# Patient Record
Sex: FEMALE | Race: WHITE | NOT HISPANIC OR LATINO | URBAN - METROPOLITAN AREA
[De-identification: names, ages, dates, MRNs, and addresses within clinical notes are randomized per-mention and may not be internally consistent; named-entity substitution may affect disease eponyms.]

---

## 2017-02-06 ENCOUNTER — EMERGENCY (EMERGENCY)
Facility: HOSPITAL | Age: 77
LOS: 1 days | Discharge: HOME | End: 2017-02-06
Attending: EMERGENCY MEDICINE | Admitting: EMERGENCY MEDICINE
Payer: COMMERCIAL

## 2017-02-06 VITALS
DIASTOLIC BLOOD PRESSURE: 88 MMHG | SYSTOLIC BLOOD PRESSURE: 136 MMHG | HEART RATE: 76 BPM | OXYGEN SATURATION: 96 % | TEMPERATURE: 99 F

## 2017-02-06 VITALS
DIASTOLIC BLOOD PRESSURE: 92 MMHG | TEMPERATURE: 99 F | OXYGEN SATURATION: 96 % | HEART RATE: 77 BPM | RESPIRATION RATE: 18 BRPM | HEIGHT: 62 IN | WEIGHT: 136.91 LBS | SYSTOLIC BLOOD PRESSURE: 177 MMHG

## 2017-02-06 PROCEDURE — 70486 CT MAXILLOFACIAL W/O DYE: CPT

## 2017-02-06 PROCEDURE — 73130 X-RAY EXAM OF HAND: CPT

## 2017-02-06 PROCEDURE — 99284 EMERGENCY DEPT VISIT MOD MDM: CPT | Mod: 25

## 2017-02-06 PROCEDURE — 73130 X-RAY EXAM OF HAND: CPT | Mod: 26,RT

## 2017-02-06 PROCEDURE — 12011 RPR F/E/E/N/L/M 2.5 CM/<: CPT

## 2017-02-06 PROCEDURE — 70450 CT HEAD/BRAIN W/O DYE: CPT

## 2017-02-06 PROCEDURE — 70450 CT HEAD/BRAIN W/O DYE: CPT | Mod: 26

## 2017-02-06 PROCEDURE — 90715 TDAP VACCINE 7 YRS/> IM: CPT

## 2017-02-06 PROCEDURE — 90471 IMMUNIZATION ADMIN: CPT

## 2017-02-06 PROCEDURE — 70486 CT MAXILLOFACIAL W/O DYE: CPT | Mod: 26

## 2017-02-06 RX ORDER — ACETAMINOPHEN 500 MG
650 TABLET ORAL ONCE
Qty: 0 | Refills: 0 | Status: COMPLETED | OUTPATIENT
Start: 2017-02-06 | End: 2017-02-06

## 2017-02-06 RX ORDER — TETANUS TOXOID, REDUCED DIPHTHERIA TOXOID AND ACELLULAR PERTUSSIS VACCINE, ADSORBED 5; 2.5; 8; 8; 2.5 [IU]/.5ML; [IU]/.5ML; UG/.5ML; UG/.5ML; UG/.5ML
0.5 SUSPENSION INTRAMUSCULAR ONCE
Qty: 0 | Refills: 0 | Status: COMPLETED | OUTPATIENT
Start: 2017-02-06 | End: 2017-02-06

## 2017-02-06 RX ADMIN — Medication 650 MILLIGRAM(S): at 19:52

## 2017-02-06 RX ADMIN — TETANUS TOXOID, REDUCED DIPHTHERIA TOXOID AND ACELLULAR PERTUSSIS VACCINE, ADSORBED 0.5 MILLILITER(S): 5; 2.5; 8; 8; 2.5 SUSPENSION INTRAMUSCULAR at 23:21

## 2017-02-06 NOTE — ED ADULT TRIAGE NOTE - CHIEF COMPLAINT QUOTE
Patient arrived to ED today with c/o trip and fall today at about 645 today.  Patient states she fell onto concrete.  Patient is forgetful of the fall and what happened after.  Patient states she was going into her house and fell on about two steps she thinks.  Patient denies that she takes blood thinners.

## 2017-02-06 NOTE — ED STATDOCS - OBJECTIVE STATEMENT
77 y/o female h/o HTN, BIB her son, presents s/p fall. Pt was walking up the concrete steps to her home when she fell and struck the right side of her head. She is unsure if fall mechanical or due to LOC, syncope, etc but remembers the fall clearly. No witness to fall. Denies blood thinner use. Pt called her son at 6:41 PM but is unsure of exact time of fall.

## 2017-02-06 NOTE — ED STATDOCS - SKIN, MLM
Abrasions to the 4th and 5th right proximal phalanges. Two superficial linear lacerations along superior right orbit.

## 2017-02-06 NOTE — ED STATDOCS - PROGRESS NOTE DETAILS
HPI, ROS, PE done by intake doc. Orders/Plan reviewed. Pt presents today with right alban orbital lac and hand pain s/p mechanical fall. Pt took tylenol and is more comfortable now. She denies nausea, vomiting, sob, cp, hp, syncope, dizziness, loss of rom of right hand,  PE- Well developed, well-nourish, resting comfortably in NAD. Cardiac- +S1, S2, without murmurs, rubs, or gallops. Pulm- lungs CTA without wheezes, ronchi, or rales. Abdomen- BS normoactive. Soft, nontender to palpation. Neuro in tact without focal deficits, A&Ox3, no gross sensory or motor deficits. Plan: Will f/u plan per intake physician. HPI, ROS, PE done by intake doc. Orders/Plan reviewed. Pt presents today with right alban orbital lac and hand pain s/p fall. Pt states she remembers falling and hitting her head, but states she may have passed out after hitting her head on the cement. Pt took tylenol for pain and is more comfortable now. She denies nausea, vomiting, sob, cp, hp, syncope, dizziness, loss of rom of right hand, pain with EOM.   Denies blood thinner use. PE- Well developed, well-nourish, resting comfortably in NAD. Cardiac- +S1, S2, without murmurs, rubs, or gallops. Pulm- lungs CTA without wheezes, ronchi, or rales. Abdomen- BS normoactive. Soft, nontender to palpation. Neuro: A&O x 3. Intact without focal deficits, no gross sensory or motor deficits. cn 2-12 intact. gait intact. Plan: Discussed CT results with pt. XR WNL. Gates bonded lac. No physical activity until cleared by neuro. Counselled on wound care and signs of infection. Tetanus vaccination.

## 2017-02-06 NOTE — ED STATDOCS - NS ED MD SCRIBE ATTENDING SCRIBE SECTIONS
VITAL SIGNS( Pullset)/HIV/PHYSICAL EXAM/REVIEW OF SYSTEMS/PAST MEDICAL/SURGICAL/SOCIAL HISTORY/DISPOSITION/HISTORY OF PRESENT ILLNESS

## 2017-02-06 NOTE — ED ADULT NURSE NOTE - OBJECTIVE STATEMENT
per pt she fell uncertain of event. noted right index and pinky finger small abrasion bruised, pt able to move fingers. right eye with two small lac, swollen and bruised.

## 2017-02-06 NOTE — ED STATDOCS - ATTENDING CONTRIBUTION TO CARE
I, Travis Victor, performed the initial face to face bedside interview with this patient regarding history of present illness, review of symptoms and relevant past medical, social and family history.  I completed an independent physical examination.  I was the initial provider who evaluated this patient. I have signed out the follow up of any pending tests (i.e. labs, radiological studies) to the ACP.  I have communicated the patient’s plan of care and disposition with the ACP.  The history, relevant review of systems, past medical and surgical history, medical decision making, and physical examination was documented by the scribe in my presence and I attest to the accuracy of the documentation.

## 2017-02-14 ENCOUNTER — APPOINTMENT (OUTPATIENT)
Dept: ORTHOPEDIC SURGERY | Facility: CLINIC | Age: 77
End: 2017-02-14

## 2017-02-15 ENCOUNTER — APPOINTMENT (OUTPATIENT)
Dept: ORTHOPEDIC SURGERY | Facility: CLINIC | Age: 77
End: 2017-02-15

## 2017-02-15 VITALS
DIASTOLIC BLOOD PRESSURE: 60 MMHG | HEART RATE: 58 BPM | WEIGHT: 132 LBS | SYSTOLIC BLOOD PRESSURE: 132 MMHG | BODY MASS INDEX: 24.29 KG/M2 | HEIGHT: 62 IN

## 2017-02-15 DIAGNOSIS — M25.531 PAIN IN RIGHT WRIST: ICD-10-CM

## 2017-02-15 DIAGNOSIS — S62.021K DISPLACED FRACTURE OF MIDDLE THIRD OF NAVICULAR [SCAPHOID] BONE OF RIGHT WRIST, SUBSEQUENT ENCOUNTER FOR FRACTURE WITH NONUNION: ICD-10-CM

## 2017-02-15 RX ORDER — LOSARTAN POTASSIUM 100 MG/1
TABLET, FILM COATED ORAL
Refills: 0 | Status: ACTIVE | COMMUNITY

## 2017-02-15 RX ORDER — ATENOLOL 50 MG/1
TABLET ORAL
Refills: 0 | Status: ACTIVE | COMMUNITY

## 2025-06-18 ENCOUNTER — NON-APPOINTMENT (OUTPATIENT)
Age: 85
End: 2025-06-18